# Patient Record
Sex: FEMALE | Race: BLACK OR AFRICAN AMERICAN | Employment: FULL TIME | URBAN - METROPOLITAN AREA
[De-identification: names, ages, dates, MRNs, and addresses within clinical notes are randomized per-mention and may not be internally consistent; named-entity substitution may affect disease eponyms.]

---

## 2022-10-05 ENCOUNTER — OFFICE VISIT (OUTPATIENT)
Dept: ENDOCRINOLOGY | Age: 62
End: 2022-10-05
Payer: COMMERCIAL

## 2022-10-05 VITALS
SYSTOLIC BLOOD PRESSURE: 154 MMHG | BODY MASS INDEX: 40.84 KG/M2 | OXYGEN SATURATION: 98 % | DIASTOLIC BLOOD PRESSURE: 92 MMHG | HEART RATE: 76 BPM | WEIGHT: 239.2 LBS | HEIGHT: 64 IN

## 2022-10-05 DIAGNOSIS — E11.65 TYPE 2 DIABETES MELLITUS WITH HYPERGLYCEMIA, WITH LONG-TERM CURRENT USE OF INSULIN (HCC): ICD-10-CM

## 2022-10-05 DIAGNOSIS — E27.8 LEFT ADRENAL MASS (HCC): Primary | ICD-10-CM

## 2022-10-05 DIAGNOSIS — Z79.4 TYPE 2 DIABETES MELLITUS WITH HYPERGLYCEMIA, WITH LONG-TERM CURRENT USE OF INSULIN (HCC): ICD-10-CM

## 2022-10-05 DIAGNOSIS — E27.8 LEFT ADRENAL MASS (HCC): ICD-10-CM

## 2022-10-05 DIAGNOSIS — E03.9 PRIMARY HYPOTHYROIDISM: ICD-10-CM

## 2022-10-05 PROBLEM — I10 ESSENTIAL HYPERTENSION: Status: ACTIVE | Noted: 2022-10-05

## 2022-10-05 PROBLEM — E78.00 HYPERCHOLESTEROLEMIA: Status: ACTIVE | Noted: 2022-10-05

## 2022-10-05 PROBLEM — J45.909 ASTHMA: Status: ACTIVE | Noted: 2022-10-05

## 2022-10-05 LAB
ALBUMIN SERPL-MCNC: 3.8 G/DL (ref 3.2–4.6)
ALBUMIN/GLOB SERPL: 1.1 {RATIO} (ref 1.2–3.5)
ALP SERPL-CCNC: 110 U/L (ref 50–136)
ALT SERPL-CCNC: 39 U/L (ref 12–65)
ANION GAP SERPL CALC-SCNC: 6 MMOL/L (ref 4–13)
AST SERPL-CCNC: 23 U/L (ref 15–37)
BILIRUB SERPL-MCNC: 0.3 MG/DL (ref 0.2–1.1)
BUN SERPL-MCNC: 19 MG/DL (ref 8–23)
CALCIUM SERPL-MCNC: 9.4 MG/DL (ref 8.3–10.4)
CHLORIDE SERPL-SCNC: 106 MMOL/L (ref 101–110)
CO2 SERPL-SCNC: 29 MMOL/L (ref 21–32)
CORTIS BS SERPL-MCNC: 11.4 UG/DL
CREAT SERPL-MCNC: 0.8 MG/DL (ref 0.6–1)
GLOBULIN SER CALC-MCNC: 3.6 G/DL (ref 2.3–3.5)
GLUCOSE SERPL-MCNC: 85 MG/DL (ref 65–100)
POTASSIUM SERPL-SCNC: 3.9 MMOL/L (ref 3.5–5.1)
PROT SERPL-MCNC: 7.4 G/DL (ref 6.3–8.2)
SODIUM SERPL-SCNC: 141 MMOL/L (ref 136–145)
T4 FREE SERPL-MCNC: 1.3 NG/DL (ref 0.78–1.46)
TSH, 3RD GENERATION: 1.47 UIU/ML (ref 0.36–3.74)

## 2022-10-05 PROCEDURE — 99204 OFFICE O/P NEW MOD 45 MIN: CPT | Performed by: INTERNAL MEDICINE

## 2022-10-05 RX ORDER — LEVOTHYROXINE SODIUM 112 UG/1
112 TABLET ORAL
COMMUNITY
End: 2022-10-05

## 2022-10-05 RX ORDER — IBUPROFEN 800 MG/1
800 TABLET ORAL EVERY 8 HOURS PRN
COMMUNITY

## 2022-10-05 RX ORDER — DEXAMETHASONE 1 MG
TABLET ORAL
Qty: 1 TABLET | Refills: 1 | Status: SHIPPED | OUTPATIENT
Start: 2022-10-05

## 2022-10-05 RX ORDER — DILTIAZEM HYDROCHLORIDE 240 MG/1
240 CAPSULE, EXTENDED RELEASE ORAL DAILY
COMMUNITY

## 2022-10-05 RX ORDER — AMLODIPINE BESYLATE 10 MG/1
10 TABLET ORAL DAILY
COMMUNITY

## 2022-10-05 RX ORDER — LEVOTHYROXINE SODIUM 112 UG/1
112 TABLET ORAL DAILY
Qty: 90 TABLET | Refills: 3
Start: 2022-10-05

## 2022-10-05 RX ORDER — TRAMADOL HYDROCHLORIDE 50 MG/1
50 TABLET ORAL EVERY 6 HOURS PRN
COMMUNITY
Start: 2019-02-13

## 2022-10-05 RX ORDER — ROSUVASTATIN CALCIUM 10 MG/1
10 TABLET, COATED ORAL NIGHTLY
COMMUNITY

## 2022-10-05 RX ORDER — METOPROLOL SUCCINATE 50 MG/1
50 TABLET, EXTENDED RELEASE ORAL
COMMUNITY

## 2022-10-05 RX ORDER — RANITIDINE 150 MG/1
150 TABLET ORAL 2 TIMES DAILY
COMMUNITY

## 2022-10-05 RX ORDER — HYDROCODONE BITARTRATE AND ACETAMINOPHEN 10; 325 MG/1; MG/1
1 TABLET ORAL EVERY 4 HOURS PRN
COMMUNITY
Start: 2019-09-17

## 2022-10-05 RX ORDER — HYDROCHLOROTHIAZIDE 25 MG/1
25 TABLET ORAL DAILY
COMMUNITY

## 2022-10-05 RX ORDER — OLMESARTAN MEDOXOMIL 40 MG/1
40 TABLET ORAL DAILY
COMMUNITY

## 2022-10-05 RX ORDER — METOPROLOL TARTRATE 50 MG/1
50 TABLET, FILM COATED ORAL 2 TIMES DAILY
COMMUNITY

## 2022-10-05 RX ORDER — (INSULIN DEGLUDEC AND LIRAGLUTIDE) 100; 3.6 [IU]/ML; MG/ML
42 INJECTION, SOLUTION SUBCUTANEOUS
Qty: 15 ML | Refills: 11
Start: 2022-10-05

## 2022-10-05 RX ORDER — NIFEDIPINE 90 MG/1
90 TABLET, EXTENDED RELEASE ORAL DAILY
COMMUNITY

## 2022-10-05 ASSESSMENT — ENCOUNTER SYMPTOMS
CONSTIPATION: 1
DIARRHEA: 0

## 2022-10-05 NOTE — PROGRESS NOTES
Aden Ruth MD, [address] [facility]            Reason for visit: Sweta Larson is referred by Oren Philippe MD for the evaluation and management of an adrenal mass. ASSESSMENT AND PLAN:    1. Left adrenal mass [facility])  Ms. Jitendra Trejo has an incidentally-discovered left adrenal mass with nonreassuring imaging phenotype. I recommended that she consider surgery. In the interim, I will arrange short-term follow-up CT to assess for stability. I will also arrange biochemical testing for hormonal hypersecretion (aldosterone/renin, fractionated plasma metanephrines, low-dose overnight dexamethasone suppression test). - DHEA; Future  - Metanephrines Plasma Free; Future  - Aldosterone; Future  - Renin; Future  - Comprehensive Metabolic Panel; Future  - Cortisol, Baseline; Future  - Dexamethasone; Future  - dexamethasone (DECADRON) 1 MG tablet; Take tablet at 11:00 PM the evening before 8:00 AM labwork  Dispense: 1 tablet; Refill: 1  - CT ABDOMEN WO CONTRAST Additional Contrast? None; Future    2. Primary hypothyroidism  I will check thyroid function today. - levothyroxine (SYNTHROID) 112 MCG tablet; Take 1 tablet by mouth Daily  Dispense: 90 tablet; Refill: 3  - TSH; Future  - T4, Free; Future    3. Type 2 diabetes mellitus with hyperglycemia, with long-term current use of insulin (HCC)  She is currently taking Xultophy at bedtime. This is best if taken with breakfast.  Defer to her primary care physician.  - XULTOPHY 100-3.6 UNIT-MG/ML SOPN injection pen; Inject 42 Units into the skin every morning (before breakfast)  Dispense: 15 mL; Refill: 11      Follow-up and Dispositions    Return in about 6 months (around 4/5/2023). History of Present Illness:    ADRENAL PROBLEM  Sweta Larson is referred for evaluation of left adrenal mass, incidentally noted on CT 7/21/2022.     Current symptoms: See review of systems below    Prior treatment: None    Pertinent labs:  2019: Potassium 4.3. Imagin2022: CT abdomen without contrast [facility])- 1.3 cm left adrenal mass. 2022: CT abdomen with contrast (adrenal protocol) [facility])- 1 cm left adrenal mass. 79 Hounsfield units postcontrast, 59 Hounsfield units delayed washout (25% washout). THYROID DYSFUNCTION  Lynn Oracio is seen for new evaluation/management of hypothyroidism; this was diagnosed in her early 62s. Current symptoms:  See review of systems below    Prior treatment: She has been on levothyroxine since diagnosis (112 mcg daily since  or ). Pertinent labs: none available    Imaging: none available        Review of Systems   Constitutional:  Positive for fatigue and unexpected weight change (previously gained ~20 pounds; intentionally lost 10+ pounds over the last 2-3 months). Cardiovascular:  Negative for palpitations. Gastrointestinal:  Positive for constipation. Negative for diarrhea. Endocrine: Positive for heat intolerance. Negative for cold intolerance. Genitourinary:  Negative for menstrual problem (LMP in her 46s). Psychiatric/Behavioral:  Positive for dysphoric mood and sleep disturbance. The patient is nervous/anxious (she attributes to recent death of her ). BP (!) 154/92   Pulse 76   Ht 5' 4\" (1.626 m)   Wt 239 lb 3.2 oz (108.5 kg)   SpO2 98%   BMI 41.06 kg/m²   Wt Readings from Last 3 Encounters:   10/05/22 239 lb 3.2 oz (108.5 kg)       Physical Exam  HENT:      Head: Normocephalic. Neck:      Thyroid: No thyroid mass or thyromegaly. Cardiovascular:      Rate and Rhythm: Normal rate. Pulmonary:      Effort: No respiratory distress. Breath sounds: Normal breath sounds. Neurological:      Mental Status: She is alert.    Psychiatric:         Mood and Affect: Mood normal.         Behavior: Behavior normal.       Orders Placed This Encounter   Procedures    CT ABDOMEN WO CONTRAST Additional Contrast? None Standing Status:   Future     Standing Expiration Date:   10/5/2023     Order Specific Question:   Additional Contrast?     Answer:   None     Order Specific Question:   Reason for exam:     Answer: Follow-up 1 cm left adrenal mass with low contrast washout on CT at [facility] 8/2022. Schedule for early November 2022.     TSH     Standing Status:   Future     Number of Occurrences:   1     Standing Expiration Date:   10/5/2023    T4, Free     Standing Status:   Future     Number of Occurrences:   1     Standing Expiration Date:   10/5/2023    DHEA     Standing Status:   Future     Number of Occurrences:   1     Standing Expiration Date:   10/5/2023    Metanephrines Plasma Free     Standing Status:   Future     Number of Occurrences:   1     Standing Expiration Date:   10/5/2023    Aldosterone     Standing Status:   Future     Number of Occurrences:   1     Standing Expiration Date:   10/5/2023    Renin     Standing Status:   Future     Number of Occurrences:   1     Standing Expiration Date:   10/5/2023    Comprehensive Metabolic Panel     Standing Status:   Future     Number of Occurrences:   1     Standing Expiration Date:   10/5/2023    Cortisol, Baseline     Standing Status:   Future     Number of Occurrences:   1     Standing Expiration Date:   10/5/2023    Dexamethasone     Standing Status:   Future     Number of Occurrences:   1     Standing Expiration Date:   10/5/2023         Current Outpatient Medications   Medication Sig Dispense Refill    dilTIAZem (TIAZAC) 240 MG extended release capsule Take 240 mg by mouth daily      hydroCHLOROthiazide (HYDRODIURIL) 25 MG tablet Take 25 mg by mouth daily      metoprolol succinate (TOPROL XL) 50 MG extended release tablet Take 50 mg by mouth      NIFEdipine (PROCARDIA XL) 90 MG extended release tablet Take 90 mg by mouth daily      raNITIdine (ZANTAC) 150 MG tablet Take 150 mg by mouth 2 times daily      rosuvastatin (CRESTOR) 10 MG tablet Take 10 mg by mouth nightly      traMADol (ULTRAM) 50 MG tablet Take 50 mg by mouth every 6 hours as needed. olmesartan (BENICAR) 40 MG tablet Take 40 mg by mouth daily      ibuprofen (ADVIL;MOTRIN) 800 MG tablet Take 800 mg by mouth every 8 hours as needed      HYDROcodone-acetaminophen (NORCO)  MG per tablet Take 1 tablet by mouth every 4 hours as needed. XULTOPHY 100-3.6 UNIT-MG/ML SOPN injection pen Inject 42 Units into the skin every morning (before breakfast) 15 mL 11    levothyroxine (SYNTHROID) 112 MCG tablet Take 1 tablet by mouth Daily 90 tablet 3    dexamethasone (DECADRON) 1 MG tablet Take tablet at 11:00 PM the evening before 8:00 AM labwork 1 tablet 1    amLODIPine (NORVASC) 10 MG tablet Take 10 mg by mouth daily (Patient not taking: Reported on 10/5/2022)      metoprolol tartrate (LOPRESSOR) 50 MG tablet Take 50 mg by mouth 2 times daily (Patient not taking: Reported on 10/5/2022)       No current facility-administered medications for this visit. Mathew Saunders MD, FACE      Portions of this note were generated with the assistance of voice recognition software. As such, some errors in transcription may be present.

## 2022-10-07 LAB
ALDOST SERPL-MCNC: 13.3 NG/DL (ref 0–30)
METANEPH FREE SERPL-MCNC: 25.7 PG/ML (ref 0–88)
NORMETANEPHRINE SERPL-MCNC: 76.6 PG/ML (ref 0–285.2)

## 2022-10-08 LAB — DHEA SERPL-MCNC: 136 NG/DL (ref 31–701)

## 2022-10-09 LAB — RENIN PLAS-CCNC: 0.53 NG/ML/HR (ref 0.17–5.38)

## 2022-10-10 LAB — DEXAMETHASONE SERPL-MCNC: <30 NG/DL

## 2022-10-20 DIAGNOSIS — E27.8 LEFT ADRENAL MASS (HCC): Primary | ICD-10-CM
